# Patient Record
Sex: FEMALE | Race: BLACK OR AFRICAN AMERICAN | NOT HISPANIC OR LATINO | URBAN - METROPOLITAN AREA
[De-identification: names, ages, dates, MRNs, and addresses within clinical notes are randomized per-mention and may not be internally consistent; named-entity substitution may affect disease eponyms.]

---

## 2018-12-06 ENCOUNTER — OUTPATIENT (OUTPATIENT)
Dept: OUTPATIENT SERVICES | Facility: HOSPITAL | Age: 23
LOS: 1 days | Discharge: HOME | End: 2018-12-06

## 2018-12-07 DIAGNOSIS — F12.20 CANNABIS DEPENDENCE, UNCOMPLICATED: ICD-10-CM

## 2024-06-13 ENCOUNTER — APPOINTMENT (OUTPATIENT)
Dept: PULMONOLOGY | Facility: CLINIC | Age: 29
End: 2024-06-13
Payer: SELF-PAY

## 2024-06-13 VITALS
OXYGEN SATURATION: 97 % | TEMPERATURE: 98.2 F | SYSTOLIC BLOOD PRESSURE: 121 MMHG | WEIGHT: 168 LBS | RESPIRATION RATE: 16 BRPM | HEART RATE: 91 BPM | DIASTOLIC BLOOD PRESSURE: 75 MMHG

## 2024-06-13 VITALS — BODY MASS INDEX: 24.81 KG/M2 | HEIGHT: 69 IN

## 2024-06-13 DIAGNOSIS — Z01.818 ENCOUNTER FOR OTHER PREPROCEDURAL EXAMINATION: ICD-10-CM

## 2024-06-13 DIAGNOSIS — R05.9 COUGH, UNSPECIFIED: ICD-10-CM

## 2024-06-13 LAB — HEMOGLOBIN: 12.9

## 2024-06-13 PROCEDURE — 71046 X-RAY EXAM CHEST 2 VIEWS: CPT

## 2024-06-13 PROCEDURE — 99204 OFFICE O/P NEW MOD 45 MIN: CPT | Mod: 25

## 2024-06-13 PROCEDURE — 85018 HEMOGLOBIN: CPT | Mod: QW

## 2024-06-13 PROCEDURE — 94060 EVALUATION OF WHEEZING: CPT

## 2024-06-13 PROCEDURE — 94727 GAS DIL/WSHOT DETER LNG VOL: CPT

## 2024-06-13 PROCEDURE — 94729 DIFFUSING CAPACITY: CPT

## 2024-06-13 NOTE — PROCEDURE
[FreeTextEntry1] : 6/2024 PA and lateral chest xray performed using standard projections. Bones and soft tissues structures are unremarkable.Cardiac silhouette appears normal. Lung fields are clear. No infiltrate or masses noted. Mediastinal contours are normal. IMPRESSION: no acute cardiopulmonary disease  pfts 6/2024 are normal mild bronchodilator response in midflows

## 2024-06-13 NOTE — ASSESSMENT
[FreeTextEntry1] : The candidate has been examined by our office and is cleared to work as a . This work will entail working in a half-way setting with potential exposure to tear gas. The candidate is cleared to wear a gas mask.

## 2024-06-17 ENCOUNTER — TRANSCRIPTION ENCOUNTER (OUTPATIENT)
Age: 29
End: 2024-06-17